# Patient Record
Sex: MALE | Race: WHITE | Employment: OTHER | URBAN - METROPOLITAN AREA
[De-identification: names, ages, dates, MRNs, and addresses within clinical notes are randomized per-mention and may not be internally consistent; named-entity substitution may affect disease eponyms.]

---

## 2024-02-16 SDOH — HEALTH STABILITY: PHYSICAL HEALTH: ON AVERAGE, HOW MANY DAYS PER WEEK DO YOU ENGAGE IN MODERATE TO STRENUOUS EXERCISE (LIKE A BRISK WALK)?: 7 DAYS

## 2024-02-16 SDOH — HEALTH STABILITY: PHYSICAL HEALTH: ON AVERAGE, HOW MANY MINUTES DO YOU ENGAGE IN EXERCISE AT THIS LEVEL?: 40 MIN

## 2024-02-19 ENCOUNTER — OFFICE VISIT (OUTPATIENT)
Age: 74
End: 2024-02-19
Payer: MEDICARE

## 2024-02-19 VITALS — BODY MASS INDEX: 28.14 KG/M2 | HEIGHT: 69 IN | WEIGHT: 190 LBS

## 2024-02-19 DIAGNOSIS — M17.12 OSTEOARTHRITIS OF LEFT KNEE, UNSPECIFIED OSTEOARTHRITIS TYPE: Primary | ICD-10-CM

## 2024-02-19 DIAGNOSIS — M17.11 OSTEOARTHRITIS OF RIGHT KNEE, UNSPECIFIED OSTEOARTHRITIS TYPE: ICD-10-CM

## 2024-02-19 DIAGNOSIS — Z01.818 PRE-OP TESTING: ICD-10-CM

## 2024-02-19 PROCEDURE — G8484 FLU IMMUNIZE NO ADMIN: HCPCS | Performed by: ORTHOPAEDIC SURGERY

## 2024-02-19 PROCEDURE — 1123F ACP DISCUSS/DSCN MKR DOCD: CPT | Performed by: ORTHOPAEDIC SURGERY

## 2024-02-19 PROCEDURE — 3017F COLORECTAL CA SCREEN DOC REV: CPT | Performed by: ORTHOPAEDIC SURGERY

## 2024-02-19 PROCEDURE — G8427 DOCREV CUR MEDS BY ELIG CLIN: HCPCS | Performed by: ORTHOPAEDIC SURGERY

## 2024-02-19 PROCEDURE — 99204 OFFICE O/P NEW MOD 45 MIN: CPT | Performed by: ORTHOPAEDIC SURGERY

## 2024-02-19 PROCEDURE — G8419 CALC BMI OUT NRM PARAM NOF/U: HCPCS | Performed by: ORTHOPAEDIC SURGERY

## 2024-02-19 PROCEDURE — 1036F TOBACCO NON-USER: CPT | Performed by: ORTHOPAEDIC SURGERY

## 2024-02-19 RX ORDER — PANTOPRAZOLE SODIUM 40 MG/1
TABLET, DELAYED RELEASE ORAL
COMMUNITY
Start: 2023-04-13

## 2024-02-19 RX ORDER — TEMAZEPAM 30 MG/1
CAPSULE ORAL
COMMUNITY
Start: 2023-04-03

## 2024-02-19 RX ORDER — LISINOPRIL 10 MG/1
TABLET ORAL
COMMUNITY
Start: 2024-01-01

## 2024-02-19 NOTE — PROGRESS NOTES
Name: Kenrick James    : 1950     [facility] ORTHOPAEDICS AND SPORTS MEDICINE  [address], [address]  [facility] [address]  Dept: [phone]  Dept Fax: [phone]     Chief Complaint   Patient presents with    Knee Pain        Ht 1.753 m (5' 9\")   Wt 86.2 kg (190 lb)   BMI 28.06 kg/m²      No Known Allergies     Current Outpatient Medications   Medication Sig Dispense Refill    temazepam (RESTORIL) 30 MG capsule 1 capsule at bedtime as needed Orally Once a day for 90 days      pantoprazole (PROTONIX) 40 MG tablet       lisinopril (PRINIVIL;ZESTRIL) 10 MG tablet        No current facility-administered medications for this visit.      There is no problem list on file for this patient.     History reviewed. No pertinent family history.    Past Surgical History:   Procedure Laterality Date    HIP SURGERY        Past Medical History:   Diagnosis Date    Acid reflux         I have reviewed and agree with PFS and ROS and intake form in chart and the record furthermore I have reviewed prior medical record(s) regarding this patients care during this appointment.     Review of Systems:   Patient is a pleasant appearing individual, appropriately dressed, well hydrated, well nourished, who is alert, appropriately oriented for age, and in no acute distress with a normal gait and normal affect who does not appear to be in any significant pain.    Physical Exam:  Left Knee -Decrease range of motion with flexion, Knee arc of greater than 50 degrees, Some crepitation, Grossly neurovascularly intact, Good cap refill, No skin lesion, Moderate swelling, some gross instability, Some quadriceps weakness Kellgren and Herman at least grade 3    Right Knee -Decrease range of motion with flexion, Some crepitation, Grossly neurovascularly intact, Good cap refill, No skin lesion, Moderate swelling, some gross instability, Some quadriceps weaknessKellgren and Herman

## 2024-03-13 DIAGNOSIS — Z01.818 PRE-OP TESTING: ICD-10-CM

## 2024-03-13 DIAGNOSIS — M17.11 OSTEOARTHRITIS OF RIGHT KNEE, UNSPECIFIED OSTEOARTHRITIS TYPE: ICD-10-CM

## 2024-03-13 PROCEDURE — 71046 X-RAY EXAM CHEST 2 VIEWS: CPT | Performed by: ORTHOPAEDIC SURGERY

## 2024-03-14 DIAGNOSIS — Z01.818 PRE-OP TESTING: ICD-10-CM

## 2024-03-14 DIAGNOSIS — M17.11 OSTEOARTHRITIS OF RIGHT KNEE, UNSPECIFIED OSTEOARTHRITIS TYPE: ICD-10-CM

## 2024-03-14 LAB
ANION GAP SERPL CALCULATED.3IONS-SCNC: 11 MMOL/L (ref 3–15)
BUN BLDV-MCNC: 12 MG/DL (ref 6–22)
CALCIUM SERPL-MCNC: 9.3 MG/DL (ref 8.4–10.5)
CHLORIDE BLD-SCNC: 109 MMOL/L (ref 98–110)
CO2: 21 MMOL/L (ref 20–32)
CREAT SERPL-MCNC: 1.5 MG/DL (ref 0.8–1.6)
GLOMERULAR FILTRATION RATE: 48.3 ML/MIN/1.73 SQ.M.
GLUCOSE: 124 MG/DL (ref 70–99)
HCT VFR BLD CALC: 38.4 % (ref 37.8–52.2)
HEMOGLOBIN: 12.5 G/DL (ref 12.6–17.1)
MCH RBC QN AUTO: 27 PG (ref 26–34)
MCHC RBC AUTO-ENTMCNC: 33 G/DL (ref 31–36)
MCV RBC AUTO: 84 FL (ref 80–95)
MRSA SCREENING CULTURE: NORMAL
PDW BLD-RTO: 14.1 % (ref 10–15.5)
PLATELET # BLD: 192 K/UL (ref 140–440)
PMV BLD AUTO: 12.1 FL (ref 9–13)
POTASSIUM SERPL-SCNC: 4.2 MMOL/L (ref 3.5–5.5)
RBC: 4.56 M/UL (ref 3.8–5.8)
SODIUM BLD-SCNC: 141 MMOL/L (ref 133–145)
WBC: 4.4 K/UL (ref 4–11)

## 2024-03-18 DIAGNOSIS — M17.11 OSTEOARTHRITIS OF RIGHT KNEE, UNSPECIFIED OSTEOARTHRITIS TYPE: ICD-10-CM

## 2024-03-18 DIAGNOSIS — Z01.818 PRE-OP TESTING: ICD-10-CM

## 2024-04-05 DIAGNOSIS — Z96.651 STATUS POST TOTAL RIGHT KNEE REPLACEMENT: Primary | ICD-10-CM

## 2024-04-18 ENCOUNTER — OFFICE VISIT (OUTPATIENT)
Age: 74
End: 2024-04-18
Payer: MEDICARE

## 2024-04-18 DIAGNOSIS — M17.11 OSTEOARTHRITIS OF RIGHT KNEE, UNSPECIFIED OSTEOARTHRITIS TYPE: Primary | ICD-10-CM

## 2024-04-18 DIAGNOSIS — I10 HYPERTENSION, UNSPECIFIED TYPE: ICD-10-CM

## 2024-04-18 PROCEDURE — 1123F ACP DISCUSS/DSCN MKR DOCD: CPT | Performed by: ORTHOPAEDIC SURGERY

## 2024-04-18 PROCEDURE — G8427 DOCREV CUR MEDS BY ELIG CLIN: HCPCS | Performed by: ORTHOPAEDIC SURGERY

## 2024-04-18 PROCEDURE — 99214 OFFICE O/P EST MOD 30 MIN: CPT | Performed by: ORTHOPAEDIC SURGERY

## 2024-04-18 PROCEDURE — 1036F TOBACCO NON-USER: CPT | Performed by: ORTHOPAEDIC SURGERY

## 2024-04-18 PROCEDURE — G8419 CALC BMI OUT NRM PARAM NOF/U: HCPCS | Performed by: ORTHOPAEDIC SURGERY

## 2024-04-18 PROCEDURE — 3017F COLORECTAL CA SCREEN DOC REV: CPT | Performed by: ORTHOPAEDIC SURGERY

## 2024-04-18 RX ORDER — ASPIRIN 325 MG
325 TABLET ORAL 2 TIMES DAILY
Qty: 60 TABLET | Refills: 0 | Status: SHIPPED | OUTPATIENT
Start: 2024-04-18

## 2024-04-18 RX ORDER — OXYCODONE HYDROCHLORIDE AND ACETAMINOPHEN 5; 325 MG/1; MG/1
1 TABLET ORAL
Qty: 30 TABLET | Refills: 0 | Status: SHIPPED | OUTPATIENT
Start: 2024-04-18 | End: 2024-04-26

## 2024-04-18 RX ORDER — ONDANSETRON 8 MG/1
8 TABLET, ORALLY DISINTEGRATING ORAL EVERY 8 HOURS PRN
Qty: 20 TABLET | Refills: 0 | Status: SHIPPED | OUTPATIENT
Start: 2024-04-18 | End: 2024-04-25

## 2024-04-18 RX ORDER — CEPHALEXIN 500 MG/1
500 CAPSULE ORAL EVERY 8 HOURS
Qty: 21 CAPSULE | Refills: 0 | Status: SHIPPED | OUTPATIENT
Start: 2024-04-18 | End: 2024-04-25

## 2024-04-18 NOTE — PROGRESS NOTES
6/10.    X-rays are positive for severe OA.    Assessment/Plan:  Plan would be for right total knee replacement.  No history of blood clots.  We will do Percocet, Keflex, Zofran, and aspirin.  We will go from there.    He has been optimized by his family doctor for hypertension.      As part of continued conservative pain management options the patient was advised to utilize Tylenol or OTC NSAIDS as long as it is not medically contraindicated.     Return to Office:    Follow-up and Dispositions    Return for ALREADY SCHEDULED FOR SURGERY.        Scribed by Khalida Marcum LPN as dictated by Tobias Titus MD.  Documentation, performed by, True and Accepted Tobias Titus MD

## 2024-04-18 NOTE — PATIENT INSTRUCTIONS
more comfortable.  Tighten the thigh muscles in your affected leg by pressing the back of your knee down. Hold your knee straight.  Keeping the thigh muscles tight and your leg straight, lift your affected leg up so that your heel is about 12 inches off the floor. Hold for about 6 seconds, then lower slowly.  Repeat 8 to 12 times.  It's a good idea to repeat these steps with your other leg.  Active knee flexion    Lie on your stomach with your knees straight. If your kneecap is uncomfortable, roll up a washcloth and put it under your leg just above your kneecap.  Lift the foot of your affected leg by bending the knee so that you bring the foot up toward your buttock. If this motion hurts, try it without bending your knee quite as far. This may help you avoid any painful motion.  Slowly move your leg up and down.  Repeat 8 to 12 times.  Switch legs and repeat steps 1 through 4, even if only one knee is sore.  Quadriceps stretch (facedown)    Lie flat on your stomach, and rest your face on the floor.  Wrap a towel or belt strap around the lower part of your affected leg. Then use the towel or belt strap to slowly pull your heel toward your buttock until you feel a stretch.  Hold for about 15 to 30 seconds, then relax your leg against the towel or belt strap.  Repeat 2 to 4 times.  Switch legs and repeat steps 1 through 4, even if only one knee is sore.  Stationary exercise bike    If you do not have a stationary exercise bike at home, you can find one to ride at your local health club or community center.  Adjust the height of the bike seat so that your knee is slightly bent when your leg is extended downward. If your knee hurts when the pedal reaches the top, you can raise the seat so that your knee does not bend as much.  Start slowly. At first, try to do 5 to 10 minutes of cycling with little to no resistance. Then increase your time and the resistance bit by bit until you can do 20 to 30 minutes without pain.  If

## 2024-04-25 ENCOUNTER — TELEPHONE (OUTPATIENT)
Age: 74
End: 2024-04-25

## 2024-04-25 DIAGNOSIS — Z96.651 STATUS POST TOTAL RIGHT KNEE REPLACEMENT: ICD-10-CM

## 2024-04-25 DIAGNOSIS — M25.561 RIGHT KNEE PAIN, UNSPECIFIED CHRONICITY: Primary | ICD-10-CM

## 2024-04-25 RX ORDER — HYDROCODONE BITARTRATE AND ACETAMINOPHEN 5; 325 MG/1; MG/1
1 TABLET ORAL EVERY 4 HOURS PRN
Qty: 42 TABLET | Refills: 0 | Status: SHIPPED | OUTPATIENT
Start: 2024-04-25 | End: 2024-05-02

## 2024-04-25 NOTE — TELEPHONE ENCOUNTER
4-23-24 RT TKR    Pt called and stated his pain medication is not helping him at all. He is taking oxyCODONE-acetaminophen (PERCOCET) 5-325 MG he said it is not helping him at all and making him woozy.       [id number] - [place], [place] - [address] - P [phone] - F [phone]

## 2024-04-30 ENCOUNTER — OFFICE VISIT (OUTPATIENT)
Age: 74
End: 2024-04-30

## 2024-04-30 DIAGNOSIS — M25.561 RIGHT KNEE PAIN, UNSPECIFIED CHRONICITY: Primary | ICD-10-CM

## 2024-04-30 DIAGNOSIS — Z96.651 STATUS POST TOTAL RIGHT KNEE REPLACEMENT: ICD-10-CM

## 2024-04-30 PROCEDURE — 99024 POSTOP FOLLOW-UP VISIT: CPT

## 2024-04-30 RX ORDER — IBUPROFEN 800 MG/1
800 TABLET ORAL 2 TIMES DAILY PRN
Qty: 60 TABLET | Refills: 0 | Status: SHIPPED | OUTPATIENT
Start: 2024-04-30

## 2024-04-30 NOTE — PATIENT INSTRUCTIONS
Post Operative Total Knee Replacement Instructions    PLEASE REMOVE YOUR LONG WHITE BANDAGE & STOCKING PRIOR TO CONNECTING TO YOUR APPOINTMENT       During your recovery from a total knee replacement, you will be participating in an OUTPATIENT physical therapy program. Your goal is to progress from a walker to a cane to nothing at all while walking, if possible, over the next 2 weeks.     You can now shower and get your incision wet, pat it dry afterwards. No further dressing changes will be required as long as there is no drainage.  You may take a bath 3 weeks post surgery as long as there is no drainage from your incision.    You may drive if you are not using any assistive devices to walk and are not using any narcotic pain medication.     You may discontinue your aspirin (if that is your primary blood thinner prescribed by Dr. Titus ) when you are at least 4 weeks out from surgery and are no longer using a cane or walker.  If you are still using assistive devices, please DO NOT stop the aspirin until you are completely off them.  If you are on other blood thinners prescribed by another doctor please continue that until you are instructed to discontinue them.    You and your physical therapist will determine when to stop your physical therapy program.    Narcotic pain medication can cause constipation.  You may take over the counter stool softeners such as Docusate Sodium or Miralax 1-2 times per day to assist with the constipation.  Ensure you are taking in plenty of fluids and fiber as well.    If you require a refill on a narcotic pain medication, please let Dr. Titus or his Nurse Practitioner know at your appointment today or AT LEAST 48 hours prior to needing it. No refills will be provided after hours or during weekends.    If you experience any significant calf pain, swelling, or shortness of breath, please call our office immediately or go to the nearest emergency room.    If you notice any significant

## 2024-04-30 NOTE — PROGRESS NOTES
Name: Kenrick James    : 1950        2024     1:41 PM   Ambulatory Bariatric Summary   Weight - Scale 190   Height 1.753 m (5' 9\")   BMI 28.1 kg/m2   Weight - Scale 86.2 kg (190 lb)   BMI (Calculated) 28.1       There is no height or weight on file to calculate BMI.    Service Dept: [place] [facility] ORTHOPAEDICS AND SPORTS MEDICINE  [address] [place] [address], [facility]  [facility] [address]  Dept: [phone]  Dept Fax: [phone]     Patient's Pharmacies:    [facility] #[id number] - [place], [place] - [address] - P [phone] - F [phone]  [address] [address]  Phone: [phone] Fax: [phone]       Chief Complaint   Patient presents with    Post-Op Check    Knee Pain       HPI:  Patient presents for postop care following a right total knee replacement with Dr. Titus on 2024.  Patient is ambulating well with a walker but has slowly began to transition off.  Pain is a 4-5 out of 10.  Patient has not gotten relief from Percocet and then was switched to Dilaudid and still has not received any relief.  Patient states in the past he was prescribed 800 mg ibuprofen and did well with that for pain.  Patient reports that therapy started off well.  Patient reports that he has had 3 visits since surgery and has already noticed improvements in his range of motion and ability to do day-to-day activities with less assistance.     There were no vitals taken for this visit.   No Known Allergies   Current Outpatient Medications   Medication Sig Dispense Refill    HYDROcodone-acetaminophen (NORCO) 5-325 MG per tablet Take 1 tablet by mouth every 4 hours as needed for Pain for up to 7 days. Intended supply: 7 days. Take lowest dose possible to manage pain Max Daily Amount: 6 tablets 42 tablet 0    aspirin 325 MG tablet Take 1 tablet by mouth in the morning and at bedtime DO NOT START MEDICATION UNTIL 24 HRS AFTER

## 2024-05-21 ENCOUNTER — OFFICE VISIT (OUTPATIENT)
Age: 74
End: 2024-05-21

## 2024-05-21 DIAGNOSIS — Z96.651 STATUS POST TOTAL RIGHT KNEE REPLACEMENT: Primary | ICD-10-CM

## 2024-05-21 DIAGNOSIS — M25.561 RIGHT KNEE PAIN, UNSPECIFIED CHRONICITY: ICD-10-CM

## 2024-05-21 PROCEDURE — 99024 POSTOP FOLLOW-UP VISIT: CPT

## 2024-05-21 RX ORDER — GABAPENTIN 300 MG/1
300 CAPSULE ORAL
Qty: 30 CAPSULE | Refills: 0 | Status: SHIPPED | OUTPATIENT
Start: 2024-05-21 | End: 2024-06-20

## 2024-05-21 NOTE — PROGRESS NOTES
Name: Kenrick James    : 1950        2024     1:41 PM   Ambulatory Bariatric Summary   Weight - Scale 190   Height 1.753 m (5' 9\")   BMI 28.1 kg/m2   Weight - Scale 86.2 kg (190 lb)   BMI (Calculated) 28.1       There is no height or weight on file to calculate BMI.    Service Dept: [facility] ORTHOPAEDICS AND SPORTS MEDICINE  [address] [place] [address], [facility]  [facility] [address]  Dept: [phone]  Dept Fax: [phone]     Patient's Pharmacies:    [facility] #[id number] - [place], [place] - [address] [phone] - F [phone]  [address] [address]  Phone: [phone] Fax: [phone]       Chief Complaint   Patient presents with    Post-Op Check    Knee Pain       HPI:  Patient presents for postop care following a right total knee replacement on 2024.  Patient is ambulating well without assistive devices.  Pain is a 2-3 out of 10 and well-controlled with ibuprofen.  Although patient does complain of significant pains at night to the point where the sheets of his bed are not even able to touch the incision.  Patient reports therapy is continue to go well and he is at approximate 112 degrees of flexion and full extension.     There were no vitals taken for this visit.   No Known Allergies   Current Outpatient Medications   Medication Sig Dispense Refill    ibuprofen (ADVIL;MOTRIN) 800 MG tablet Take 1 tablet by mouth 2 times daily as needed for Pain 60 tablet 0    aspirin 325 MG tablet Take 1 tablet by mouth in the morning and at bedtime DO NOT START MEDICATION UNTIL 24 HRS AFTER SURGERY 60 tablet 0    temazepam (RESTORIL) 30 MG capsule 1 capsule at bedtime as needed Orally Once a day for 90 days      pantoprazole (PROTONIX) 40 MG tablet       lisinopril (PRINIVIL;ZESTRIL) 10 MG tablet        No current facility-administered medications for this visit.      There is no problem list on